# Patient Record
Sex: FEMALE | Race: AMERICAN INDIAN OR ALASKA NATIVE | URBAN - METROPOLITAN AREA
[De-identification: names, ages, dates, MRNs, and addresses within clinical notes are randomized per-mention and may not be internally consistent; named-entity substitution may affect disease eponyms.]

---

## 2022-06-15 ENCOUNTER — OFFICE VISIT (OUTPATIENT)
Dept: URBAN - METROPOLITAN AREA CLINIC 51 | Facility: CLINIC | Age: 72
End: 2022-06-15
Payer: MEDICARE

## 2022-06-15 DIAGNOSIS — H04.123 TEAR FILM INSUFFICIENCY OF BILATERAL LACRIMAL GLANDS: ICD-10-CM

## 2022-06-15 DIAGNOSIS — H43.813 VITREOUS DEGENERATION, BILATERAL: ICD-10-CM

## 2022-06-15 DIAGNOSIS — H52.4 PRESBYOPIA: ICD-10-CM

## 2022-06-15 DIAGNOSIS — H25.813 COMBINED FORMS OF AGE-RELATED CATARACT, BILATERAL: Primary | ICD-10-CM

## 2022-06-15 DIAGNOSIS — H35.3131 NONEXUDATIVE AGE-RELATED MACULAR DEGENERATION, BILATERAL, EARLY DRY STAGE: ICD-10-CM

## 2022-06-15 PROCEDURE — 99204 OFFICE O/P NEW MOD 45 MIN: CPT | Performed by: OPTOMETRIST

## 2022-06-15 PROCEDURE — 92134 CPTRZ OPH DX IMG PST SGM RTA: CPT | Performed by: OPTOMETRIST

## 2022-06-15 ASSESSMENT — INTRAOCULAR PRESSURE
OD: 16
OS: 15

## 2022-06-15 ASSESSMENT — VISUAL ACUITY
OS: 20/30
OD: 20/30

## 2022-06-15 ASSESSMENT — KERATOMETRY
OD: 45.34
OS: 45.48

## 2022-06-15 NOTE — IMPRESSION/PLAN
Impression: Nonexudative age-related macular degeneration, bilateral, early dry stage: H35.3131. Plan: Drusen OU, no SRF OU. Discussed condition with patient. Discussed harmful effects of smoking. Counseling about the benefits and/or risks of the Age-Related Eye Disease Study 2(AREDS) formulation for preventing progression of age-related macular degeneration (AMD) was provided. Recommend UV blocking sunglasses when outdoors and eating green leafy vegetables. OCT performed today and reviewed. OCT check annually. Disp Amsler grid. RTC if significant decline with vision or distortions that is new

## 2022-06-15 NOTE — IMPRESSION/PLAN
Impression: Combined forms of age-related cataract, bilateral: H25.813. Plan: Discussed today's findings with patient. The cataracts reduce visual function to a level that interferes with the patient's everyday activities and are progressing. Based on clinical findings discussed possibility of CAT Sx at this time and possible benefit patient would achieve. At this time patient elects to wait and purchase new glasses. Continue to monitor.  Patient will call if any changes/worsening in vision

## 2023-05-18 ENCOUNTER — OFFICE VISIT (OUTPATIENT)
Dept: URBAN - METROPOLITAN AREA CLINIC 44 | Facility: CLINIC | Age: 73
End: 2023-05-18
Payer: MEDICARE

## 2023-05-18 DIAGNOSIS — H04.123 DRY EYE SYNDROME OF BILATERAL LACRIMAL GLANDS: ICD-10-CM

## 2023-05-18 DIAGNOSIS — H35.3131 NONEXUDATIVE AGE-RELATED MACULAR DEGENERATION, BILATERAL, EARLY DRY STAGE: ICD-10-CM

## 2023-05-18 DIAGNOSIS — H43.813 VITREOUS DEGENERATION, BILATERAL: ICD-10-CM

## 2023-05-18 DIAGNOSIS — H35.3121 NONEXUDATIVE MACULAR DEGENERATION, EARLY DRY STAGE, LEFT EYE: ICD-10-CM

## 2023-05-18 DIAGNOSIS — H25.813 COMBINED FORMS OF AGE-RELATED CATARACT, BILATERAL: Primary | ICD-10-CM

## 2023-05-18 PROCEDURE — 99214 OFFICE O/P EST MOD 30 MIN: CPT | Performed by: OPTOMETRIST

## 2023-05-18 PROCEDURE — 92134 CPTRZ OPH DX IMG PST SGM RTA: CPT | Performed by: OPTOMETRIST

## 2023-05-18 ASSESSMENT — KERATOMETRY
OS: 44.88
OD: 45.63

## 2023-05-18 ASSESSMENT — INTRAOCULAR PRESSURE
OS: 16
OD: 16

## 2023-05-18 ASSESSMENT — VISUAL ACUITY
OD: 20/40
OS: 20/60

## 2023-05-18 NOTE — IMPRESSION/PLAN
Impression: Nonexudative macular degeneration, early dry stage, left eye: H35.4694. Plan: Dry Age Related Macular Degeneration - Patient educated regarding findings. Recommend patient take an ARED's formula multiple vitamin daily. Observation is all that is indicated at this time.

## 2023-05-18 NOTE — IMPRESSION/PLAN
Impression: Combined forms of age-related cataract, bilateral: H25.813. Plan:  Discussed cataracts with patient. Discussed treatment options. Surgical treatment is recommended. Surgical risks and benefits discussed. Patient elects surgical treatment. Recommend surgery OU, OS first. Patient is candidate/interested in   Aim OS: plano. Aim OD: plano. Patient will need glasses for full time wear. multifocal, toric, standard, LenSx and ORA.

## 2023-06-22 ENCOUNTER — ADULT PHYSICAL (OUTPATIENT)
Dept: URBAN - METROPOLITAN AREA CLINIC 44 | Facility: CLINIC | Age: 73
End: 2023-06-22
Payer: MEDICARE

## 2023-06-22 DIAGNOSIS — H25.813 COMBINED FORMS OF AGE-RELATED CATARACT, BILATERAL: ICD-10-CM

## 2023-06-22 DIAGNOSIS — Z01.818 ENCOUNTER FOR OTHER PREPROCEDURAL EXAMINATION: Primary | ICD-10-CM

## 2023-06-22 PROCEDURE — 99203 OFFICE O/P NEW LOW 30 MIN: CPT | Performed by: PHYSICIAN ASSISTANT

## 2023-06-22 PROCEDURE — 92025 CPTRIZED CORNEAL TOPOGRAPHY: CPT | Performed by: OPHTHALMOLOGY

## 2023-06-22 ASSESSMENT — PACHYMETRY
OS: 2.94
OD: 2.85
OD: 22.42
OS: 22.64

## 2023-06-29 ENCOUNTER — PRE-OPERATIVE VISIT (OUTPATIENT)
Dept: URBAN - METROPOLITAN AREA CLINIC 44 | Facility: CLINIC | Age: 73
End: 2023-06-29
Payer: MEDICARE

## 2023-06-29 DIAGNOSIS — H04.123 DRY EYE SYNDROME OF BILATERAL LACRIMAL GLANDS: ICD-10-CM

## 2023-06-29 DIAGNOSIS — H35.3131 NONEXUDATIVE AGE-RELATED MACULAR DEGENERATION, BILATERAL, EARLY DRY STAGE: ICD-10-CM

## 2023-06-29 DIAGNOSIS — H52.203 BILATERAL ASTIGMATISM: ICD-10-CM

## 2023-06-29 DIAGNOSIS — H25.813 COMBINED FORMS OF AGE-RELATED CATARACT, BILATERAL: Primary | ICD-10-CM

## 2023-06-29 DIAGNOSIS — H43.393 OTHER VITREOUS OPACITIES, BILATERAL: ICD-10-CM

## 2023-06-29 DIAGNOSIS — H25.811 COMBINED FORMS OF AGE-RELATED CATARACT, RIGHT EYE: ICD-10-CM

## 2023-06-29 PROCEDURE — 99204 OFFICE O/P NEW MOD 45 MIN: CPT | Performed by: OPHTHALMOLOGY

## 2023-06-29 ASSESSMENT — INTRAOCULAR PRESSURE
OD: 18
OS: 16

## 2023-06-29 NOTE — IMPRESSION/PLAN
Impression: Combined forms of age-related cataract, bilateral: H25.813. Plan: PLAN:  (( AIM  -0.25 OU: INJECTABLE OU (Paul Martinez 1st choice, TRIMOXI 2nd), NO ORA OU, NO LRI OU (minimal astig) , NOTE LENS TYPE:[id number]  DENSE    )) Discussed cataract diagnosis with the patient. Appropriate testing ordered for cataract diagnosis prior to Preop. Risks and benefits of surgical treatment were discussed and understood. Patient desires surgical treatment. Specialty lens options discussed and pt declines, Pt does not mind wearing glasses after sx. Patient desires to proceed with surgery OU, OS FIRST. Both eyes examined, medically necessary due to impact in activities of daily living. Patient understands the need for glasses after surgery. Discussed there is a chance of developing capsular haze after surgery, which may be corrected with laser/yag after surgery. Understands higher risk of complication and delayed healing due to dense cataract.

## 2023-06-29 NOTE — IMPRESSION/PLAN
Impression: Bilateral astigmatism: H52.203. Plan:  Discussed with patient minimal astigmatism and the need for glasses after surgery. Discussed with patient, understands this may limit vision after surgery. Also discussed unmasking of astigmatism.

## 2023-06-29 NOTE — IMPRESSION/PLAN
Impression: Nonexudative age-related macular degeneration, bilateral, early dry stage: H35.3131. Plan: AREDS and a diet heavier in green leafy vegetables discussed. Signs and symptoms of WET macular degeneration were discussed (wavy vision, blurred vision). Patient instructed to call or return to clinic if condition gets worse. Discussed with patient, understands this may limit vision after surgery.

## 2023-07-20 ENCOUNTER — SURGERY (OUTPATIENT)
Dept: URBAN - METROPOLITAN AREA SURGERY 19 | Facility: SURGERY | Age: 73
End: 2023-07-20
Payer: MEDICARE

## 2023-07-20 DIAGNOSIS — H25.813 COMBINED FORMS OF AGE-RELATED CATARACT, BILATERAL: Primary | ICD-10-CM

## 2023-07-20 PROCEDURE — 66984 XCAPSL CTRC RMVL W/O ECP: CPT | Performed by: OPHTHALMOLOGY

## 2023-07-21 ENCOUNTER — POST-OPERATIVE VISIT (OUTPATIENT)
Dept: URBAN - METROPOLITAN AREA CLINIC 44 | Facility: CLINIC | Age: 73
End: 2023-07-21
Payer: MEDICARE

## 2023-07-21 DIAGNOSIS — Z48.810 ENCOUNTER FOR SURGICAL AFTERCARE FOLLOWING SURGERY ON A SENSE ORGAN: Primary | ICD-10-CM

## 2023-07-21 PROCEDURE — 99024 POSTOP FOLLOW-UP VISIT: CPT | Performed by: OPTOMETRIST

## 2023-07-21 ASSESSMENT — INTRAOCULAR PRESSURE: OS: 16

## 2023-07-21 NOTE — IMPRESSION/PLAN
Impression: S/P Cataract Extraction by phacoemulsification with IOL placement OS - 1 Day. Encounter for surgical aftercare following surgery on a sense organ  Z48.810. Plan: Healing well. Continue ATs QID+.   RTC for post-op as scheduled, sooner PRN

## 2023-07-28 ENCOUNTER — POST-OPERATIVE VISIT (OUTPATIENT)
Dept: URBAN - METROPOLITAN AREA CLINIC 44 | Facility: CLINIC | Age: 73
End: 2023-07-28
Payer: MEDICARE

## 2023-07-28 DIAGNOSIS — Z48.810 ENCOUNTER FOR SURGICAL AFTERCARE FOLLOWING SURGERY ON A SENSE ORGAN: Primary | ICD-10-CM

## 2023-07-28 DIAGNOSIS — H52.4 PRESBYOPIA: ICD-10-CM

## 2023-07-28 PROCEDURE — 99024 POSTOP FOLLOW-UP VISIT: CPT | Performed by: OPTOMETRIST

## 2023-07-28 PROCEDURE — 92015 DETERMINE REFRACTIVE STATE: CPT | Performed by: OPTOMETRIST

## 2023-07-28 ASSESSMENT — VISUAL ACUITY
OD: 20/40
OS: 20/25

## 2023-07-28 ASSESSMENT — KERATOMETRY
OD: 45.63
OS: 45.00

## 2023-07-28 ASSESSMENT — INTRAOCULAR PRESSURE
OD: 16
OS: 16

## 2023-08-04 ENCOUNTER — POST-OPERATIVE VISIT (OUTPATIENT)
Dept: URBAN - METROPOLITAN AREA CLINIC 44 | Facility: CLINIC | Age: 73
End: 2023-08-04
Payer: MEDICARE

## 2023-08-04 DIAGNOSIS — Z48.810 ENCOUNTER FOR SURGICAL AFTERCARE FOLLOWING SURGERY ON A SENSE ORGAN: Primary | ICD-10-CM

## 2023-08-04 PROCEDURE — 99024 POSTOP FOLLOW-UP VISIT: CPT | Performed by: OPTOMETRIST

## 2023-08-04 ASSESSMENT — INTRAOCULAR PRESSURE: OD: 17

## 2023-09-01 ENCOUNTER — POST-OPERATIVE VISIT (OUTPATIENT)
Dept: URBAN - METROPOLITAN AREA CLINIC 44 | Facility: CLINIC | Age: 73
End: 2023-09-01
Payer: MEDICARE

## 2023-09-01 DIAGNOSIS — H52.4 PRESBYOPIA: Primary | ICD-10-CM

## 2023-09-01 DIAGNOSIS — Z48.810 ENCOUNTER FOR SURGICAL AFTERCARE FOLLOWING SURGERY ON A SENSE ORGAN: ICD-10-CM

## 2023-09-01 PROCEDURE — 99024 POSTOP FOLLOW-UP VISIT: CPT | Performed by: OPTOMETRIST

## 2023-09-01 PROCEDURE — 92015 DETERMINE REFRACTIVE STATE: CPT | Performed by: OPTOMETRIST

## 2023-09-01 ASSESSMENT — KERATOMETRY
OD: 45.50
OS: 44.63

## 2023-09-01 ASSESSMENT — INTRAOCULAR PRESSURE
OS: 16
OD: 16

## 2023-09-01 ASSESSMENT — VISUAL ACUITY
OD: 20/20
OS: 20/30

## 2024-08-22 ENCOUNTER — OFFICE VISIT (OUTPATIENT)
Dept: URBAN - METROPOLITAN AREA CLINIC 44 | Facility: CLINIC | Age: 74
End: 2024-08-22
Payer: MEDICARE

## 2024-08-22 DIAGNOSIS — H04.123 DRY EYE SYNDROME OF BILATERAL LACRIMAL GLANDS: ICD-10-CM

## 2024-08-22 DIAGNOSIS — H35.3131 NONEXUDATIVE AGE-RELATED MACULAR DEGENERATION, BILATERAL, EARLY DRY STAGE: Primary | ICD-10-CM

## 2024-08-22 DIAGNOSIS — H26.493 OTHER SECONDARY CATARACT, BILATERAL: ICD-10-CM

## 2024-08-22 DIAGNOSIS — H43.313 VITREOUS MEMBRANES AND STRANDS, BILATERAL: ICD-10-CM

## 2024-08-22 PROCEDURE — 99214 OFFICE O/P EST MOD 30 MIN: CPT | Performed by: OPTOMETRIST

## 2024-08-22 PROCEDURE — 92134 CPTRZ OPH DX IMG PST SGM RTA: CPT | Performed by: OPTOMETRIST

## 2024-08-22 ASSESSMENT — INTRAOCULAR PRESSURE
OD: 12
OS: 14

## 2024-08-22 ASSESSMENT — VISUAL ACUITY
OD: 20/25
OS: 20/30

## 2024-08-22 ASSESSMENT — KERATOMETRY: OD: 45.38
